# Patient Record
Sex: MALE | HISPANIC OR LATINO | Employment: FULL TIME | URBAN - METROPOLITAN AREA
[De-identification: names, ages, dates, MRNs, and addresses within clinical notes are randomized per-mention and may not be internally consistent; named-entity substitution may affect disease eponyms.]

---

## 2020-05-04 ENCOUNTER — APPOINTMENT (OUTPATIENT)
Dept: RADIOLOGY | Facility: MEDICAL CENTER | Age: 55
End: 2020-05-04
Attending: EMERGENCY MEDICINE
Payer: OTHER MISCELLANEOUS

## 2020-05-04 ENCOUNTER — HOSPITAL ENCOUNTER (EMERGENCY)
Facility: MEDICAL CENTER | Age: 55
End: 2020-05-04
Attending: EMERGENCY MEDICINE
Payer: OTHER MISCELLANEOUS

## 2020-05-04 VITALS
WEIGHT: 212.3 LBS | DIASTOLIC BLOOD PRESSURE: 87 MMHG | OXYGEN SATURATION: 94 % | RESPIRATION RATE: 15 BRPM | HEIGHT: 64 IN | HEART RATE: 68 BPM | BODY MASS INDEX: 36.25 KG/M2 | SYSTOLIC BLOOD PRESSURE: 130 MMHG | TEMPERATURE: 96.9 F
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
## 2020-05-04 DIAGNOSIS — S49.92XA INJURY OF LEFT SHOULDER, INITIAL ENCOUNTER: ICD-10-CM

## 2020-05-04 PROCEDURE — 99284 EMERGENCY DEPT VISIT MOD MDM: CPT

## 2020-05-04 PROCEDURE — A9270 NON-COVERED ITEM OR SERVICE: HCPCS | Performed by: EMERGENCY MEDICINE

## 2020-05-04 PROCEDURE — 73030 X-RAY EXAM OF SHOULDER: CPT | Mod: LT

## 2020-05-04 PROCEDURE — 700102 HCHG RX REV CODE 250 W/ 637 OVERRIDE(OP): Performed by: EMERGENCY MEDICINE

## 2020-05-04 RX ORDER — IBUPROFEN 800 MG/1
800 TABLET ORAL ONCE
Status: COMPLETED | OUTPATIENT
Start: 2020-05-04 | End: 2020-05-04

## 2020-05-04 RX ORDER — ACETAMINOPHEN 325 MG/1
650 TABLET ORAL ONCE
Status: COMPLETED | OUTPATIENT
Start: 2020-05-04 | End: 2020-05-04

## 2020-05-04 RX ADMIN — IBUPROFEN 800 MG: 800 TABLET, FILM COATED ORAL at 10:45

## 2020-05-04 RX ADMIN — ACETAMINOPHEN 650 MG: 325 TABLET, FILM COATED ORAL at 10:56

## 2020-05-04 SDOH — HEALTH STABILITY: MENTAL HEALTH: HOW OFTEN DO YOU HAVE A DRINK CONTAINING ALCOHOL?: NEVER

## 2020-05-04 NOTE — LETTER
"  FORM C-4:  EMPLOYEE’S CLAIM FOR COMPENSATION/ REPORT OF INITIAL TREATMENT  EMPLOYEE’S CLAIM - PROVIDE ALL INFORMATION REQUESTED   First Name Tera Last Name Danielito Martin Birthdate 1965  Sex Male Claim Number   Home Employee Address [address]                                     Zip  [address] Height  1.626 m (5' 4\") Weight  96.3 kg (212 lb 4.9 oz) [facility]     Mailing Employee Address [address]               Zip  [address] Telephone  [phone] (home)  Primary Language Spoken Romansh   Insurer   Third Party    Employee's Occupation (Job Title) When Injury or Occupational Disease Occurred     Employer's Name : [employer] Telephone [phone]    Employer Address [address] [facility] [address]] Zip [address]   Date of Injury      5/4/2020   Hour of Injury  0800 Date Employer Notified  05/04/2020 Last Day of Work after Injury or Occupational Disease 05/04/2020   Supervisor to Whom Injury Reported: SOLEDAD SANCHEZ     Address or Location of Accident (if applicable)  [place]   What were you doing at the time of accident? 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 Be specific and answer in detail. Use additional sheet if necessary) I WAS IN THE TRENCH, PREPARING TO PUT THE PIPES TOGETHER AND THE DIRT BEHIND ME STARTED FALLING BEHIND ME WHERE I WAS WORKING CAUSING THE [place]YWOOD BEHIND ME TO FALL, KNOCKING ME TO THE GROUND, INJURING MY LEFT SHOULDER     If you believe that you have an occupational disease, when did you first have knowledge of the disability and it relationship to your employment? N/A Witnesses to the Accident  SOLEDAD SANCHEZ   Nature of Injury or Occupational Disease   WORKERS COMPENSATION   Part(s) of Body Injured or Affected  , , LEFT SHOULDER   I CERTIFY THAT THE ABOVE IS TRUE AND CORRECT TO THE BEST OF MY KNOWLEDGE AND " THAT I HAVE PROVIDED THIS INFORMATION IN ORDER TO OBTAIN THE BENEFITS OF [place]).  I HEREBY AUTHORIZE ANY PHYSICIAN, CHIROPRACTOR, SURGEON, PRACTITIONER, OR OTHER PERSON, ANY HOSPITAL, INCLUDING [facility], ANY MEDICAL SERVICE ORGANIZATION, ANY INSURANCE COMPANY, OR OTHER INSTITUTION OR ORGANIZATION TO RELEASE TO EACH OTHER, ANY MEDICAL OR OTHER INFORMATION, INCLUDING BENEFITS PAID OR PAYABLE, PERTINENT TO THIS INJURY OR DISEASE, EXCEPT INFORMATION RELATIVE TO DIAGNOSIS, TREATMENT AND/OR COUNSELING FOR AIDS, PSYCHOLOGICAL CONDITIONS, ALCOHOL OR CONTROLLED SUBSTANCES, FOR WHICH I MUST GIVE SPECIFIC AUTHORIZATION.  A PHOTOSTAT OF THIS AUTHORIZATION SHALL BE AS VALID AS THE ORIGINAL.  Date    05/04/2020                            Place          [facility]                                      Employee’s Signature   THIS REPORT MUST BE COMPLETED AND MAILED WITHIN 3 WORKING DAYS OF TREATMENT   Place [facility], EMERGENCY DEPT                                                                             Name of Facility [facility]   Date  5/4/2020 Diagnosis  (S49.92XA) Injury of left shoulder, initial encounter  (W19.XXXA) Fall, initial encounter Is there evidence the injured employee was under the influence of alcohol and/or another controlled substance at the time of accident?   Hour  11:47 AM Description of Injury or Disease  Injury of left shoulder, initial encounter  Fall, initial encounter No   Treatment  Sling, otc ibuprofen/tylenol/ice  Have you advised the patient to remain off work five days or more?         No   X-Ray Findings  Negative If Yes   From Date  5/4/2020 To Date  5/5/2020   From information given by the employee, together with medical evidence, can you directly connect this injury or occupational disease as job incurred? Yes If No,  "is employee capable of: Full Duty  No Modified Duty  Yes   Is additional medical care by a physician indicated? Yes If Modified Duty, Specify any Limitations / Restrictions   No use left arm at work   Do you know of any previous injury or disease contributing to this condition or occupational disease? No    Date 5/4/2020 Print Doctor’s Name Cristofer Desai certify the employer’s copy of this form was mailed on:   Address [address] [place] [id number] [phone] INSURER’S USE ONLY   Provider’s Tax ID Number [id number] Telephone Dept: [phone]    Doctor’s Signature brandon-CRISTOFER King M.D. Degree  M.D.      Form C-4 (rev.10/07)                                                                         BRIEF DESCRIPTION OF RIGHTS AND BENEFITS  (Pursuant to [place] 616[place])    Notice of Injury or Occupational Disease (Incident Report Form C-1): If an injury or occupational disease (OD) arises out of and in the course of employment, you must provide written notice to your employer as soon as practicable, but no later than 7 days after the accident or OD. Your employer shall maintain a sufficient supply of the required forms.    Claim for Compensation (Form C-4): If medical treatment is sought, the form C-4 is available at the place of initial treatment. A completed \"Claim for Compensation\" (Form C-4) must be filed within 90 days after an accident or OD. The treating physician or chiropractor must, within 3 working days after treatment, complete and mail to the employer, the employer's insurer and third-party , the Claim for Compensation.    Medical Treatment: If you require medical treatment for your on-the-job injury or OD, you may be required to select a physician or chiropractor from a list provided by your workers’ compensation insurer, if it has contracted with an Organization for Managed Care (MCO) or Preferred Provider Organization (PPO) or providers of health care. If your employer has not " entered into a contract with an MCO or PPO, you may select a physician or chiropractor from the Panel of Physicians and Chiropractors. Any medical costs related to your industrial injury or OD will be paid by your insurer.    Temporary Total Disability (TTD): If your doctor has certified that you are unable to work for a period of at least 5 consecutive days, or 5 cumulative days in a 20-day period, or places restrictions on you that your employer does not accommodate, you may be entitled to TTD compensation.    Temporary Partial Disability (TPD): If the wage you receive upon reemployment is less than the compensation for TTD to which you are entitled, the insurer may be required to pay you TPD compensation to make up the difference. TPD can only be paid for a maximum of 24 months.    Permanent Partial Disability (PPD): When your medical condition is stable and there is an indication of a PPD as a result of your injury or OD, within 30 days, your insurer must arrange for an evaluation by a rating physician or chiropractor to determine the degree of your PPD. The amount of your PPD award depends on the date of injury, the results of the PPD evaluation and your age and wage.    Permanent Total Disability (PTD): If you are medically certified by a treating physician or chiropractor as permanently and totally disabled and have been granted a PTD status by your insurer, you are entitled to receive monthly benefits not to exceed 66 2/3% of your average monthly wage. The amount of your PTD payments is subject to reduction if you previously received a PPD award.    Vocational Rehabilitation Services: You may be eligible for vocational rehabilitation services if you are unable to return to the job due to a permanent physical impairment or permanent restrictions as a result of your injury or occupational disease.    Transportation and Per Vani Reimbursement: You may be eligible for travel expenses and per vani associated with  medical treatment.    Reopening: You may be able to reopen your claim if your condition worsens after claim closure.     Appeal Process: If you disagree with a written determination issued by the insurer or the insurer does not respond to your request, you may appeal to the Department of Administration, , by following the instructions contained in your determination letter. You must appeal the determination within 70 days from the date of the determination letter at [address], [address], [place], [place] [address], or [address], [address], [place], [place] [address]. If you disagree with the  decision, you may appeal to the Department of Administration, . You must file your appeal within 30 days from the date of the  decision letter at [address], [address], [place], [place] [address], or [address], [place], [place] [address]. If you disagree with a decision of an , you may file a petition for judicial review with the District Court. You must do so within 30 days of the Appeal Officer’s decision. You may be represented by an  at your own expense or you may contact the [facility] for possible representation.    [place]  for Injured Workers (NAIW): If you disagree with a  decision, you may request that NAIW represent you without charge at an  Hearing. For information regarding denial of benefits, you may contact the [facility] at: [address], [address], [place] [address], [phone], or [address], [address], [place] [address], [phone]    To File a Complaint with the Division: If you wish to file a complaint with the  of the Division of Industrial Relations (DIR),  please contact the Workers’ Compensation Section, [address], [address], [place], [place] [address], telephone [phone], or [address], [address], [place], [place] [address], telephone [phone].    For assistance with Workers’ Compensation Issues: You may contact the Office of the Governor Consumer Health Assistance, [address] [place] [address], [address], [place], [place] [address], Toll Free 1-924.161.9801, Web site: [email or web address]., E-mail [email or web address].  D-2 (rev. 06/18)              __________________________________________________________________                                    _________________            Employee Name / Signature                                                                                                                            Date

## 2020-05-04 NOTE — DISCHARGE INSTRUCTIONS
Babita ibuprofen 600mg 4 veces diario a menos que le have makenzie al estomago.  Anade tylenol y ponga hielo en hombro 15 minutos 6 veces diario dos glass.  Vaya a Occupational Health y vaya a Dr. Chu.    Posiblamiente usted tiene dimitri presion de magui.  Vaya a un medico propio para chequear la presion y el nivel de cholesterol.  142/86

## 2020-05-04 NOTE — ED PROVIDER NOTES
"ED Provider Note    CHIEF COMPLAINT   Chief Complaint   Patient presents with   • Shoulder Injury     left shoulder, 2 pipes fell onto left shoulder   • T-5000   • Tingling     left fingers     History obtained directly in [place]    HPI   Tera Villa is a 54 y.o. male who presents with left nondominant shoulder pain.  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  He is unsure but he also may have been injured by striking his elbow on the ground.    He has some tingling in the fingertips but no weakness.  He has some mild low back pain.  Shoulder pain is quite severe and worse with movement.  Denies head injury, neck pain, chest pain or abdominal pain    REVIEW OF SYSTEMS   Pertinent positives: Work-related left shoulder injury.  Pertinent negatives: Weakness, fever, cough, shortness of breath.    PAST MEDICAL HISTORY   Denies    PAST SURGICAL HISTORY  No prior shoulder surgery    CURRENT MEDICATIONS   Home Medications     Reviewed by Latoya Ramires R.N. (Registered Nurse) on 05/04/20 at 0958  Med List Status: <None>   Medication Last Dose Status        Patient Lalo Taking any Medications                       ALLERGIES   No Known Allergies    PHYSICAL EXAM  VITAL SIGNS: /86   Pulse 72   Temp 36.1 °C (96.9 °F) (Temporal)   Resp 16   Ht 1.626 m (5' 4\")   Wt 96.3 kg (212 lb 4.9 oz)   SpO2 96%   BMI 36.44 kg/m²   Constitutional: Well developed, Well nourished, mildly elevated blood pressure, appears to be in pain, holding his left arm against his body.  HENT: Atraumatic.   Cardiovascular:  Peripheral pulses radial 2+.  Skin: Intact without wounds or bruising.   Musculoskeletal: Tenderness over the proximal humerus.  No vacancy under the acromion.  No clavicle or AC tenderness.  No elbow tenderness.  No tenderness below the elbow.  Cervical thoracic and lumbar spine nontender  Compartments: Soft arm and forearm  Neurologic: Finger flexion extension " abduction and thumb opposition intact.  Sensation intact on the pads of the first and fifth finger over the first dorsal webspace of the hand and over the deltoid.    RADIOLOGY/PROCEDURES  DX-SHOULDER 2+ LEFT   Final Result      1.  Normal shoulder series.          COURSE & MEDICAL DECISION MAKING  Well-appearing patient presents with a work-related left shoulder injury and likely has a rotator cuff injury.  There is no evidence of clavicle or humeral fracture.  There is no glenohumeral dislocation and there is no evidence of any spine elbow forearm wrist or hand injury.    PLAN:  Sling  Over-the-counter ibuprofen and Tylenol  [place]  C4 form completed  Follow-up occupational health and Dr. Chu tomorrow    CONDITION: good    FINAL IMPRESSION  1. Injury of left shoulder, initial encounter    2. Fall, initial encounter            Electronically signed by: Luis Antonio Desai M.D., 5/4/2020 10:22 AM

## 2020-05-04 NOTE — ED TRIAGE NOTES
.  Chief Complaint   Patient presents with   • Shoulder Injury     left shoulder, 2 pipes fell onto left shoulder   • T-5000   • Tingling     left fingers   ambulated to triage with above c/c. Telugu interpretor [id number]

## 2020-05-04 NOTE — LETTER
"  FORM C-4:  EMPLOYEE’S CLAIM FOR COMPENSATION/ REPORT OF INITIAL TREATMENT  EMPLOYEE’S CLAIM - PROVIDE ALL INFORMATION REQUESTED   First Name Tera Last Name Danielito Martin Birthdate 1965  Sex Male Claim Number   Home Employee Address [address]                                     Zip  [address] Height  1.626 m (5' 4\") Weight  96.3 kg (212 lb 4.9 oz) [facility]     Mailing Employee Address [address]               Zip  [address] Telephone  [phone]   Primary Language Spoken Maori   Insurer   Third Party    Employee's Occupation (Job Title) When Injury or Occupational Disease Occurred     Employer's Name [employer] Telephone [phone]    Employer Address [address] [facility] [[address]] Zip [address]   Date of Injury  5/4/2020       Hour of Injury  8:00 AM Date Employer Notified  5/4/2020 Last Day of Work after Injury or Occupational Disease  5/4/2020 Supervisor to Whom Injury Reported  SOLEDADYARA PAEZLONG   Address or Location of Accident (if applicable) [[place]]   What were you doing at the time of accident? 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 Be specific and answer in detail. Use additional sheet if necessary)  I WAS IN THE TRENCH PREPARING TO PUT THE PIPES TOGETHER AND THE DIRT BEHIND ME STARTING FALLING BEHIND ME WHERE I WAS WORKING CAUSINGTHE PLYWOOD BEHIND ME TO FALL, KNOCKING ME TO THE GROUND, INJURING MY LEFT SHOULDER.   If you believe that you have an occupational disease, when did you first have knowledge of the disability and it relationship to your employment? N/A Witnesses to the Accident  SOLEDAD SANCHEZ   Nature of Injury or Occupational Disease  Workers' Compensation Part(s) of Body Injured or Affected  Shoulder (L), N/A, N/A    I CERTIFY THAT THE ABOVE IS TRUE AND CORRECT TO THE BEST OF MY KNOWLEDGE AND THAT I HAVE " PROVIDED THIS INFORMATION IN ORDER TO OBTAIN THE BENEFITS OF [place] IN[place]IVE OR CHAPTER 617 OF [place]).  I HEREBY AUTHORIZE ANY PHYSICIAN, CHIROPRACTOR, SURGEON, PRACTITIONER, OR OTHER PERSON, ANY HOSPITAL, INCLUDING [facility] OR [facility], ANY MEDICAL SERVICE ORGANIZATION, ANY INSURANCE COMPANY, OR OTHER INSTITUTION OR ORGANIZATION TO RELEASE TO EACH OTHER, ANY MEDICAL OR OTHER INFORMATION, INCLUDING BENEFITS PAID OR PAYABLE, PERTINENT TO THIS INJURY OR DISEASE, EXCEPT INFORMATION RELATIVE TO DIAGNOSIS, TREATMENT AND/OR COUNSELING FOR AIDS, PSYCHOLOGICAL CONDITIONS, ALCOHOL OR CONTROLLED SUBSTANCES, FOR WHICH I MUST GIVE SPECIFIC AUTHORIZATION.  A PHOTOSTAT OF THIS AUTHORIZATION SHALL BE AS VALID AS THE ORIGINAL.  Date           5/4/2020                Place           [facility]                                              Employee’s Signature   THIS REPORT MUST BE COMPLETED AND MAILED WITHIN 3 WORKING DAYS OF TREATMENT   Place [facility], EMERGENCY DEPT                                                                             Name of Facility [facility]   Date  5/4/2020 Diagnosis  (S49.92XA) Injury of left shoulder, initial encounter  (W19.XXXA) Fall, initial encounter Is there evidence the injured employee was under the influence of alcohol and/or another controlled substance at the time of accident?   Hour  11:58 AM Description of Injury or Disease  Injury of left shoulder, initial encounter  Fall, initial encounter No   Treatment  Sling, otc ibuprofen/tylenol/ice  Have you advised the patient to remain off work five days or more?         No   X-Ray Findings  Negative If Yes   From Date  5/4/2020 To Date  5/5/2020   From information given by the employee, together with medical evidence, can you directly connect this injury or occupational disease as job incurred? Yes If No, is  "employee capable of: Full Duty  No Modified Duty  Yes   Is additional medical care by a physician indicated? Yes If Modified Duty, Specify any Limitations / Restrictions   No use left arm at work   Do you know of any previous injury or disease contributing to this condition or occupational disease? No    Date 5/4/2020 Print Doctor’s Name Cristofer Desai certify the employer’s copy of this form was mailed on:   Address [address] [place] [id number] [phone] INSURER’S USE ONLY   Provider’s Tax ID Number [id number] Telephone Dept: [phone]    Doctor’s Signature brandon-CRISTOFER King M.D. Degree  M.D.      Form C-4 (rev.10/07)                                                                         BRIEF DESCRIPTION OF RIGHTS AND BENEFITS  (Pursuant to [place]RS 616C.050)    Notice of Injury or Occupational Disease (Incident Report Form C-1): If an injury or occupational disease (OD) arises out of and in the course of employment, you must provide written notice to your employer as soon as practicable, but no later than 7 days after the accident or OD. Your employer shall maintain a sufficient supply of the required forms.    Claim for Compensation (Form C-4): If medical treatment is sought, the form C-4 is available at the place of initial treatment. A completed \"Claim for Compensation\" (Form C-4) must be filed within 90 days after an accident or OD. The treating physician or chiropractor must, within 3 working days after treatment, complete and mail to the employer, the employer's insurer and third-party , the Claim for Compensation.    Medical Treatment: If you require medical treatment for your on-the-job injury or OD, you may be required to select a physician or chiropractor from a list provided by your workers’ compensation insurer, if it has contracted with an Organization for Managed Care (MCO) or Preferred Provider Organization (PPO) or providers of health care. If your employer has not " entered into a contract with an MCO or PPO, you may select a physician or chiropractor from the Panel of Physicians and Chiropractors. Any medical costs related to your industrial injury or OD will be paid by your insurer.    Temporary Total Disability (TTD): If your doctor has certified that you are unable to work for a period of at least 5 consecutive days, or 5 cumulative days in a 20-day period, or places restrictions on you that your employer does not accommodate, you may be entitled to TTD compensation.    Temporary Partial Disability (TPD): If the wage you receive upon reemployment is less than the compensation for TTD to which you are entitled, the insurer may be required to pay you TPD compensation to make up the difference. TPD can only be paid for a maximum of 24 months.    Permanent Partial Disability (PPD): When your medical condition is stable and there is an indication of a PPD as a result of your injury or OD, within 30 days, your insurer must arrange for an evaluation by a rating physician or chiropractor to determine the degree of your PPD. The amount of your PPD award depends on the date of injury, the results of the PPD evaluation and your age and wage.    Permanent Total Disability (PTD): If you are medically certified by a treating physician or chiropractor as permanently and totally disabled and have been granted a PTD status by your insurer, you are entitled to receive monthly benefits not to exceed 66 2/3% of your average monthly wage. The amount of your PTD payments is subject to reduction if you previously received a PPD award.    Vocational Rehabilitation Services: You may be eligible for vocational rehabilitation services if you are unable to return to the job due to a permanent physical impairment or permanent restrictions as a result of your injury or occupational disease.    Transportation and Per Vani Reimbursement: You may be eligible for travel expenses and per vani associated with  medical treatment.    Reopening: You may be able to reopen your claim if your condition worsens after claim closure.     Appeal Process: If you disagree with a written determination issued by the insurer or the insurer does not respond to your request, you may appeal to the Department of Administration, , by following the instructions contained in your determination letter. You must appeal the determination within 70 days from the date of the determination letter at [address], [address], [place], [place] [address], or [address], [place], [place] [address]. If you disagree with the  decision, you may appeal to the Department of Administration, . You must file your appeal within 30 days from the date of the  decision letter at [address], [address], [place], [place] [address], or [address], [place], [place] [address]. If you disagree with a decision of an , you may file a petition for judicial review with the District Court. You must do so within 30 days of the Appeal Officer’s decision. You may be represented by an  at your own expense or you may contact the [facility] for possible representation.    [place]  for Injured Workers (NAIW): If you disagree with a  decision, you may request that NAIW represent you without charge at an  Hearing. For information regarding denial of benefits, you may contact the [facility] at: [address], [address], [place] [address], [phone], or [address], [address], [place] [address], [phone]    To File a Complaint with the Division: If you wish to file a complaint with the  of the Division of Industrial Relations (DIR),  please contact the Workers’ Compensation Section, [address], [address], [place], [place] [address], telephone [phone], or [address], [address], [place], [place] [address], telephone [phone].    For assistance with Workers’ Compensation Issues: You may contact the Office of the Governor Consumer Health Assistance, [address] [place] [address], [address], [place], [place] [address], Toll Free 1-520.697.3959, Web site: [email or web address]., E-mail [email or web address].  D-2 (rev. 06/18)              __________________________________________________________________                                    _________________            Employee Name / Signature                                                                                                                            Date

## 2020-05-04 NOTE — ED NOTES
services used: [facility] #[id number].  Pt discharged to home.  Pt given discharge paperwork and all applicable prescriptions written by provider.  Pt to follow with PCP, when indicated.  Pt verbalizes understanding of discharge teaching and will return to ED if condition worsens.

## 2020-05-05 ENCOUNTER — OCCUPATIONAL MEDICINE (OUTPATIENT)
Dept: URGENT CARE | Facility: CLINIC | Age: 55
End: 2020-05-05
Payer: OTHER MISCELLANEOUS

## 2020-05-05 VITALS
HEIGHT: 64 IN | BODY MASS INDEX: 36.19 KG/M2 | WEIGHT: 212 LBS | TEMPERATURE: 99.4 F | OXYGEN SATURATION: 98 % | RESPIRATION RATE: 14 BRPM | HEART RATE: 68 BPM

## 2020-05-05 DIAGNOSIS — S43.402D SPRAIN OF LEFT SHOULDER, SUBSEQUENT ENCOUNTER: ICD-10-CM

## 2020-05-05 PROCEDURE — 99213 OFFICE O/P EST LOW 20 MIN: CPT | Performed by: NURSE PRACTITIONER

## 2020-05-05 ASSESSMENT — ENCOUNTER SYMPTOMS
CONSTITUTIONAL NEGATIVE: 1
MYALGIAS: 1
WEAKNESS: 1
TINGLING: 1
RESPIRATORY NEGATIVE: 1
SENSORY CHANGE: 1
CARDIOVASCULAR NEGATIVE: 1
PSYCHIATRIC NEGATIVE: 1

## 2020-05-05 ASSESSMENT — PAIN SCALES - GENERAL: PAINLEVEL: 5=MODERATE PAIN

## 2020-05-05 NOTE — PROGRESS NOTES
Subjective:      Tera Martin is a 54 y.o. male who presents with Follow-Up ( DOI 05/04/2020 Right Shoulder - Same - RM 03)      DOI 5/4/20: Tera Villa is a 54 y.o. male who presents with left nondominant shoulder pain.  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  He is unsure but he also may have been injured by striking his elbow on the ground.    He has some tingling in the fingertips but no weakness.  He has some mild low back pain.  Shoulder pain is quite severe and worse with movement.  Denies head injury, neck pain, chest pain or abdominal pain.     Patient speaks Icelandic,  used at this visit.  Patient states no improvement, severe pain.  Pain is described as constant at rest, throbbing, aching, and tingling in his fingers.  Patient denies numbness.  Patient states he is only able to move his shoulder when he is bent over, otherwise it is very weak and he cannot lift it above waist height.  Patient is taking Tylenol with moderate relief.  Patient is icing 2-3 times a day with moderate relief.  Patient is wearing an arm sling 100% of the time.  Patient has not been back to work since incident.  Patient denies previous trauma, arthritis, or health issues i.e. diabetes.  Plan of care discussed with patient.     HPI    Review of Systems   Constitutional: Negative.    Respiratory: Negative.    Cardiovascular: Negative.    Musculoskeletal: Positive for joint pain and myalgias.   Skin: Negative.    Neurological: Positive for tingling, sensory change and weakness.   Psychiatric/Behavioral: Negative.         ROS: All systems were reviewed on intake form, form was reviewed and signed. See scanned documents in media. Pertinent positives and negatives included in HPI.    PMH: No pertinent past medical history to this problem  MEDS: Medications were reviewed in Epic  ALLERGIES: No Known Allergies  SOCHX: Works as  at [employer]  FH: No pertinent family history to this problem   Objective:     Pulse 68   Temp 37.4 °C (99.4 °F) (Temporal)   Resp 14   Ht 1.626 m (5' 4\")   Wt 96.2 kg (212 lb)   SpO2 98%   BMI 36.39 kg/m²      Physical Exam  Constitutional:       General: He is not in acute distress.     Appearance: Normal appearance. He is not ill-appearing.   Cardiovascular:      Rate and Rhythm: Normal rate and regular rhythm.   Musculoskeletal:         General: Tenderness and signs of injury present. No swelling or deformity.   Skin:     General: Skin is warm and dry.      Capillary Refill: Capillary refill takes less than 2 seconds.      Findings: No bruising or erythema.   Neurological:      General: No focal deficit present.      Mental Status: He is alert and oriented to person, place, and time.      Cranial Nerves: No cranial nerve deficit.      Sensory: No sensory deficit.      Motor: Weakness present.      Coordination: Coordination normal.      Gait: Gait normal.      Deep Tendon Reflexes: Reflexes normal.   Psychiatric:         Mood and Affect: Mood normal.         Behavior: Behavior normal.         Left shoulder: No obvious deformities noted.  No edema, erythema, bruising, or warmth present.  Very limited range of motion, secondary to pain.  Patient observed able to move arm and shoulder while bent over, however when he sits up severe guarding and poor effort in exam.  Apley scratch test negative.  Inability to participate in apprehension test.  Bicep tendon appears to be intact, no obvious abnormalities observed.  Distal strength and neurovascular sensation intact, with limited exam at this visit.  Positive TTP to AC joint, with very light touch    Left shoulder x-ray 5/4/2020: IMPRESSION:     1.  Normal shoulder series.       Assessment/Plan:       1. Sprain of left shoulder, subsequent encounter    Follow-up in 2 weeks   Restricted duty   Continue with OTC Tylenol as needed   Continue with icing 2-3 times per day " or as needed   Continue with arm sling while at work or walking on uneven surfaces, wean at home   Gentle range of motion and stretching exercises as demonstrated   Consider physical therapy referral, if indicated

## 2020-05-05 NOTE — LETTER
[facility]  [address], [address] [place] [address]  Phone:  [phone] - Fax:  [phone]   [facility] Progress Report and Disability Certification  Date of Service: 5/5/2020   No Show:  No  Date / Time of Next Visit: 5/19/2020 @ 9:30 am    Claim Information   Patient Name: Tera Martin  Claim Number:     Employer:   [employer] Date of Injury: 5/4/2020     Insurer / TPA: Misc Workers Comp  ID / SSN:     Occupation:   Diagnosis: The encounter diagnosis was Sprain of left shoulder, subsequent encounter.    Medical Information   Related to Industrial Injury? Yes    Subjective Complaints:  DOI 5/4/20: Tera Villa is a 54 y.o. male who presents with left nondominant shoulder pain.  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  He is unsure but he also may have been injured by striking his elbow on the ground.    He has some tingling in the fingertips but no weakness.  He has some mild low back pain.  Shoulder pain is quite severe and worse with movement.  Denies head injury, neck pain, chest pain or abdominal pain.     Patient speaks Citizen of the [place],  used at this visit.  Patient states no improvement, severe pain.  Pain is described as constant at rest, throbbing, aching, and tingling in his fingers.  Patient denies numbness.  Patient states he is only able to move his shoulder when he is bent over, otherwise it is very weak and he cannot lift it above waist height.  Patient is taking Tylenol with moderate relief.  Patient is icing 2-3 times a day with moderate relief.  Patient is wearing an arm sling 100% of the time.  Patient has not been back to work since incident.  Patient denies previous trauma, arthritis, or health issues i.e. diabetes.  Plan of care discussed with patient.   Objective Findings: Left shoulder: No obvious deformities noted.  No edema, erythema, bruising, or  warmth present.  Very limited range of motion, secondary to pain.  Patient observed able to move arm and shoulder while bent over, however when he sits up severe guarding and poor effort in exam.  Apley scratch test negative.  Inability to participate in apprehension test.  Bicep tendon appears to be intact, no obvious abnormalities observed.  Distal strength and neurovascular sensation intact, with limited exam at this visit.  Positive TTP to AC joint, with very light touch    Left shoulder x-ray 5/4/2020: IMPRESSION:     1.  Normal shoulder series.   Pre-Existing Condition(s):     Assessment:   Condition Same    Status: Additional Care Required  Permanent Disability:No    Plan:      Diagnostics:      Comments:  Follow-up in 2 weeks  Restricted duty  Continue with OTC Tylenol as needed  Continue with icing 2-3 times per day or as needed  Continue with arm sling while at work or walking on uneven surfaces, wean at home  Gentle range of motion and stretching e  xercises as demonstrated  Consider physical therapy referral, if indicated     Disability Information   Status: Released to Restricted Duty    From:  5/5/2020  Through: 5/19/2020 Restrictions are: Temporary   Physical Restrictions   Sitting:    Standing:    Stooping:    Bending:      Squatting:    Walking:    Climbing:    Pushing:  < or = to 1 hr/day  Comments:Left shoulder only   Pulling:  < or = to 1 hr/day  Comments:Left shoulder only Other:    Reaching Above Shoulder (L): < or = to 2 hrs/day Reaching Above Shoulder (R):       Reaching Below Shoulder (L):    Reaching Below Shoulder (R):      Not to exceed Weight Limits   Carrying(hrs):   Weight Limit(lb): < or = to 10 pounds  Comments:Left shoulder only Lifting(hrs):   Weight  Limit(lb): < or = to 10 pounds  Comments:Left shoulder only   Comments:      Repetitive Actions   Hands: i.e. Fine Manipulations from Grasping:     Feet: i.e. Operating Foot Controls:     Driving / Operate Machinery:     Physician  Name: MARCUS Mari Physician Signature:   e-Signature: Dr. Jamey Harley, Medical Director   Clinic Name / Location: [address], [address]  [place] [address] Clinic Phone Number: Dept: [phone]   Appointment Time: 2:30 Pm Visit Start Time: 1:42 PM   Check-In Time:  1:42 Pm Visit Discharge Time:  2:25 pm   Original-Treating Physician or Chiropractor    Page 2-Insurer/TPA    Page 3-Employer    Page 4-Employee

## 2020-05-27 ENCOUNTER — OCCUPATIONAL MEDICINE (OUTPATIENT)
Dept: URGENT CARE | Facility: CLINIC | Age: 55
End: 2020-05-27
Payer: COMMERCIAL

## 2020-05-27 DIAGNOSIS — S43.402D SPRAIN OF LEFT SHOULDER, SUBSEQUENT ENCOUNTER: ICD-10-CM

## 2020-05-27 PROCEDURE — 99212 OFFICE O/P EST SF 10 MIN: CPT | Performed by: NURSE PRACTITIONER

## 2020-05-27 ASSESSMENT — ENCOUNTER SYMPTOMS
CONSTITUTIONAL NEGATIVE: 1
MYALGIAS: 1
RESPIRATORY NEGATIVE: 1
PSYCHIATRIC NEGATIVE: 1
NEUROLOGICAL NEGATIVE: 1
CARDIOVASCULAR NEGATIVE: 1

## 2020-05-27 NOTE — LETTER
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    Medical Information   Related to Industrial Injury? Yes    Subjective Complaints:  DOI 5/4/20: Tera Villa is a 54 y.o. male who presents with left nondominant shoulder pain.  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  He is unsure but he also may have been injured by striking his elbow on the ground.    He has some tingling in the fingertips but no weakness.  He has some mild low back pain.  Shoulder pain is quite severe and worse with movement.  Denies head injury, neck pain, chest pain or abdominal pain.      Patient speaks Azeri,  used at this visit.  Patient states no improvement, moderate pain.  Pain is described as intermittent, bone-on-bone, throbbing, aching, and tingling in his fingers.  Patient denies numbness. Patient is rarely taking Tylenol because he feels it's not that bad.  Patient is icing 2-3 times a day with moderate relief.  Patient has weaned the arm sling.  Patient tolerating light duty.  Patient denies previous trauma, arthritis, or health issues i.e. diabetes.  Plan of care discussed with patient.    Objective Findings: Left shoulder: No obvious deformities noted.  No edema, erythema, bruising, or warmth present.  Limited ROM, secondary to pain.  Patient observed with improved movement, until about shoulder height.  Apley scratch test  negative.  Apprehension test, pos.  Bicep tendon appears to be intact, no obvious abnormalities observed.  Distal strength and neurovascular sensation intact, with limited exam at this visit.  Positive TTP to AC joint, with very light touch     Left shoulder x-ray 5/4/2020: IMPRESSION:     1.  Normal shoulder series.   Pre-Existing Condition(s):     Assessment:   Condition Same    Status: Additional Care Required  Permanent Disability:No    Plan: PTDiagnostics    Diagnostics: MRI    Comments:  Follow-up in 3 weeks   Restricted duty   Continue with OTC Tylenol as needed   Continue with icing 2-3 times per day or as needed   Continue with arm sling while at work or walking on uneven surfaces, wean at home   Gentle range of motion and stretch  ing exercises as demonstrated   Physical therapy referral placed  MRI ordered      Disability Information   Status: Released to Restricted Duty    From:  5/27/2020  Through: 6/17/2020 Restrictions are: Temporary   Physical Restrictions   Sitting:    Standing:    Stooping:    Bending:      Squatting:    Walking:    Climbing:    Pushing:  < or = to 1 hr/day  Comments:Left shoulder only    Pulling:  < or = to 1 hr/day  Comments:Left shoulder only  Other:    Reaching Above Shoulder (L): < or = to 2 hrs/day  Comments:Left shoulder only  Reaching Above Shoulder (R):       Reaching Below Shoulder (L):    Reaching Below Shoulder (R):      Not to exceed Weight Limits   Carrying(hrs):   Weight Limit(lb): < or = to 10 pounds  Comments:Left shoulder only  Lifting(hrs):   Weight  Limit(lb): < or = to 10 pounds  Comments:Left shoulder only    Comments:      Repetitive Actions   Hands: i.e. Fine Manipulations from Grasping: < or = to 1 hr/day  Comments:Left shoulder only    Feet: i.e. Operating Foot Controls:     Driving / Operate Machinery:     Physician Name: MARCUS Mari Physician Signature:   e-Signature: Dr. Jamey Harley, Medical Director   Clinic Name / Location: [facility]  MEDICAL [address], [address]  [place] [address] Clinic Phone Number: Dept: [phone]   Appointment Time: 3:30 Pm Visit Start Time:    Check-In Time:  3:20 Pm Visit Discharge Time: 3:45 PM    Original-Treating Physician or Chiropractor    Page 2-Insurer/TPA    Page 3-Employer    Page 4-Employee

## 2020-05-27 NOTE — PROGRESS NOTES
Subjective:      Tera Martin is a 54 y.o. male who presents with No chief complaint on file.      DOI 5/4/20: Tera Villa is a 54 y.o. male who presents with left nondominant shoulder pain.  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  He is unsure but he also may have been injured by striking his elbow on the ground.    He has some tingling in the fingertips but no weakness.  He has some mild low back pain.  Shoulder pain is quite severe and worse with movement.  Denies head injury, neck pain, chest pain or abdominal pain.      Patient speaks [place]araguan,  used at this visit.  Patient states no improvement, moderate pain.  Pain is described as intermittent, bone-on-bone, throbbing, aching, and tingling in his fingers.  Patient denies numbness. Patient is rarely taking Tylenol because he feels it's not that bad.  Patient is icing 2-3 times a day with moderate relief.  Patient has weaned the arm sling.  Patient tolerating light duty.  Patient denies previous trauma, arthritis, or health issues i.e. diabetes.  Plan of care discussed with patient.      HPI    Review of Systems   Constitutional: Negative.    Respiratory: Negative.    Cardiovascular: Negative.    Musculoskeletal: Positive for joint pain and myalgias.   Skin: Negative.    Neurological: Negative.    Psychiatric/Behavioral: Negative.         SOCHX: Works as  at [employer]  FH: No pertinent family history to this problem   Objective:     There were no vitals taken for this visit.     Physical Exam  Constitutional:       General: He is not in acute distress.     Appearance: Normal appearance. He is not ill-appearing.   Cardiovascular:      Pulses: Normal pulses.   Pulmonary:      Effort: Pulmonary effort is normal.   Musculoskeletal:         General: Tenderness and signs of injury present. No swelling or deformity.   Skin:     General: Skin is warm and dry.       Capillary Refill: Capillary refill takes less than 2 seconds.      Findings: No bruising or erythema.   Neurological:      General: No focal deficit present.      Mental Status: He is alert and oriented to person, place, and time.      Cranial Nerves: No cranial nerve deficit.      Sensory: No sensory deficit.      Motor: No weakness.      Coordination: Coordination normal.      Gait: Gait normal.      Deep Tendon Reflexes: Reflexes normal.   Psychiatric:         Mood and Affect: Mood normal.         Behavior: Behavior normal.         Left shoulder: No obvious deformities noted.  No edema, erythema, bruising, or warmth present.  Limited ROM, secondary to pain.  Patient observed with improved movement, until about shoulder height.  Apley scratch test negative.  Apprehension test, pos.  Bicep tendon appears to be intact, no obvious abnormalities observed.  Distal strength and neurovascular sensation intact, with limited exam at this visit.  Positive TTP to AC joint, with very light touch     Left shoulder x-ray 5/4/2020: IMPRESSION:     1.  Normal shoulder series.       Assessment/Plan:       1. Sprain of left shoulder, subsequent encounter    - MR-SHOULDER-W/O LEFT; Future  - REFERRAL TO RADIOLOGY  - REFERRAL TO PHYSICAL THERAPY Reason for Therapy: Eval/Treat/Report    Follow-up in 3 weeks   Restricted duty   Continue with OTC Tylenol as needed   Continue with icing 2-3 times per day or as needed   Continue with arm sling while at work or walking on uneven surfaces, wean at home   Gentle range of motion and stretching exercises as demonstrated   Physical therapy referral placed   MRI ordered

## 2020-06-05 ENCOUNTER — PHYSICAL THERAPY (OUTPATIENT)
Dept: PHYSICAL THERAPY | Facility: REHABILITATION | Age: 55
End: 2020-06-05
Attending: NURSE PRACTITIONER
Payer: COMMERCIAL

## 2020-06-05 DIAGNOSIS — S43.402D SPRAIN OF LEFT SHOULDER, SUBSEQUENT ENCOUNTER: ICD-10-CM

## 2020-06-05 PROCEDURE — 97161 PT EVAL LOW COMPLEX 20 MIN: CPT

## 2020-06-05 PROCEDURE — 97014 ELECTRIC STIMULATION THERAPY: CPT

## 2020-06-05 SDOH — ECONOMIC STABILITY: GENERAL: QUALITY OF LIFE: EXCELLENT

## 2020-06-05 ASSESSMENT — ENCOUNTER SYMPTOMS
PAIN SCALE AT HIGHEST: 7
EXACERBATED BY: CARRYING
EXACERBATED BY: SLEEPING
PAIN SCALE: 2
PAIN SCALE AT LOWEST: 5
EXACERBATED BY: OVERHEAD ACTIVITY
QUALITY: ACHING
ALLEVIATING FACTORS: POSITION CHANGE
PAIN TIMING: INTERMITTENT
EXACERBATED BY: LIFTING
QUALITY: TIGHT
ALLEVIATING FACTORS: ACTIVITY MODIFICATION

## 2020-06-05 NOTE — Clinical Note
June 5, 2020    JACQUELYN Mari  [address] [facility] [address]    Patient: Tera Martin   YOB: 1965   Date of Visit: 6/5/2020       Dear Jacquelyn Mari  [address] [place] [id number] [phone]    The attached plan of care is being sent to you because your patient’s medical reimbursement requires that you certify the plan of care. Your signature is required to allow uninterrupted insurance coverage.      You may indicate your approval by signing below and faxing this form back to us at Dept Fax: [phone].    Please call Dept: [phone] with any questions or concerns.    Thank you for this referral,        Ara Gamble, PT, DPT  [facility] PHYSICAL [facility]  [address] [facility] [address] [phone]    Payer: Payor: [employer] / Plan:  [employer] / Product Type: *No Product type* /                                                                  Specialty Plan of Care 06/05/20   Effective from: 6/5/2020  Effective to: 7/10/2020    Plan ID: [id number]           Participants     Name Type Comments Contact Info    JACQUELYN Mari Provider  [phone]    Ara Gamble PT, DPT PT        Evaluation/Progress Summary     Author: Ara Gamble PT, DPT Status: Sign when Signing Visit Last edited: 6/5/2020  9:30 AM         Outpatient Physical Therapy  INITIAL EVALUATION    [facility]  [address] [address]  Phone:  [phone]  Fax:  [phone]    Date of Evaluation: 06/05/2020    Patient: Tera Martin  YOB: 1965  MRN: [id number]     Referring Provider: JACQUELYN Mari  [address] [place] [address]   Referring Diagnosis Sprain of left shoulder, subsequent encounter [S43.402D]     Time Calculation                   Chief Complaint: Shoulder Injury and Work-Related Injury    Visit Diagnoses    "ICD-10-CM   1. Sprain of left shoulder, subsequent encounter  S43.402D         Subjective:   History of Present Illness:     Mechanism of injury:  Evaluation using language line  Ukrainian [id number]    Patient with chief complaint of L shoulder pain.     Per ED note 2020    \"Tera Villa is a 54 y.o. male who presents with left nondominant shoulder pain.  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  He is unsure but he also may have been injured by striking his elbow on the ground.    He has some tingling in the fingertips but no weakness.  He has some mild low back pain.  Shoulder pain is quite severe and worse with movement.  Denies head injury, neck pain, chest pain or abdominal pain\"    X-rays showed no pathology. Scheduled for MRI.     At today's visit:  Pain is anterior and posterior L shoulder/proximal arm. At time, pain radiates down anterior L arm to elbow. Denies numbness/tingling.  Currently, L shoulder hurts primarily with lifting it above 90 deg forwards or to the side. Symptoms are further provoked with lifting, pulling. The pain also wakes him up at night if laying on L side. There is mild pain at rest.  Relieved with tylenol. Only takes tylenol when it is flared up.     Is currently on light duty at work.             Quality of life:  Excellent  Prior level of function:  Works full time, manual labor outdoors, lifting, pushing, pulling  Headaches:  no headaches  Sleep disturbance:  Interrupted sleep  Pain:     Current pain ratin    At best pain ratin    At worst pain ratin    Quality:  Aching and tight    Pain timing:  Intermittent    Relieving factors:  Activity modification and position change    Aggravating factors:  Carrying, sleeping, lifting and overhead activity    Progression:  Improving  Hand dominance:  Right  Diagnostic Tests:     X-ray: normal    Treatments:     Current treatment:  Activity modification  Patient " Goals:     Patient goals for therapy:  Return to work, increased motion, decreased pain and increased strength    Other patient goals:  To get back to work      No past medical history on file.  No past surgical history on file.  Social History     Tobacco Use   • Smoking status: Never Smoker   • Smokeless tobacco: Never Used   Substance Use Topics   • Alcohol use: Never     Frequency: Never     Family and Occupational History     Socioeconomic History   • Marital status: Single     Spouse name: Not on file   • Number of children: Not on file   • Years of education: Not on file   • Highest education level: Not on file   Occupational History   • Not on file       Objective     Static Posture     Head  Forward.    Shoulders  Rounded.    Cervical Screen    Cervical range of motion within normal limits  Thoracic Screen    Thoracic range of motion within normal limits    Neurological Testing     Sensation     Shoulder   Left Shoulder   Intact: light touch    Right Shoulder   Intact: light touch    Reflexes   Left   Biceps (C5/C6): normal (2+)  Brachioradialis (C6): normal (2+)  Triceps (C7): normal (2+)    Right   Biceps (C5/C6): normal (2+)  Brachioradialis (C6): normal (2+)  Triceps (C7): normal (2+)    Palpation   Left   No palpable tenderness to the biceps and cervical paraspinals.   Hypertonic in the latissimus and supraspinatus.   Tenderness of the infraspinatus, latissimus, pectoralis major, subscapularis and supraspinatus.     Tenderness     Left Shoulder   Tenderness in the subscapularis tendon and supraspinatus tendon.     Right Shoulder  No tenderness     Active Range of Motion   Left Shoulder   Flexion: 120 degrees with pain  Extension: WFL  Abduction: 120 degrees with pain  External rotation 0°: 78 degrees with pain    Right Shoulder   Normal active range of motion    Passive Range of Motion   Left Shoulder   Flexion: 170 degrees   Extension: WFL  Abduction: WFL  Adduction: WFL  External rotation 0°: 80  (posterior shoudler pain with OP at end range) degrees with pain  External rotation 90°: 90 (posterior shoulder pain with OP at end range) degrees with pain    Right Shoulder   Normal passive range of motion  Flexion: 180 degrees   External rotation 0°: 90 degrees   External rotation 90°: 100 degrees     Scapular Mobility   Left Shoulder   Scapular mobility: WFL    Right Shoulder   Scapular mobility: WFL    Joint Play   Left Shoulder     Anterior capsule: within functional limits    Posterior capsule: hypomobile    Inferior capsule: within functional limits    AC joint: within functional limits    SC joint: within funcational limits    Cervical spine: within functional limits    Right Shoulder     Anterior capsule: within functional limits    Posterior capsule: hypomobile    Inferior capsule: within functional limits    AC joint: within functional limits    SC joint: within normal limits    Cervical spine: within functional limits    Strength:      Left Shoulder   Planes of Motion   Flexion: 2+ (limited by pain)   Extension: 5   Abduction: 2+ (limited by pain)   Adduction: 5   External rotation at 0°: 4+ (with pain)   Internal rotation at 0°: 4+ (with pain)     Right Shoulder   Planes of Motion   Flexion: 5   Extension: 5   Abduction: 5   Adduction: 5   External rotation at 0°: 5   Internal rotation at 0°: 5     Tests     Left Shoulder   Positive empty can, full can and painful arc.   Negative belly press and Speed's.     Additional Tests Details  Full can/empty can + for pain. No significant weakness.        Therapeutic Exercises (CPT 16978):       Therapeutic Exercise Summary: Access Code: [email or web address]   URL: [email or web address]   Date: 06/05/2020   Prepared by: Ara Gamble     Exercises  Seated Scapular Retraction - 10 reps - 10x daily - 7x weekly  Corner Pec Major Stretch - 2 reps - 30 seconds hold - 10x daily - 7x weekly  Standing Shoulder Row with Anchored Resistance - 5 reps - 3 sets - 5 seocnds hold  - 10x daily - 7x weekly      Time-based treatments/modalities:           Assessment, Response and Plan:   Impairments: abnormal or restricted ROM, difficulty performing job, impaired physical strength, lacks appropriate home exercise program, limited ADL's and pain with function    Assessment details:  54 year old male presents with chief complaint of L shoulder pain following an accident at work on 5/4/2020 in which 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 Exam findings show rotator cuff pathology accompanied by mild PROM restriction likely secondary to restricted active movement following the injury. The patient will benefit from skilled PT to address associated impairments/limitations in order to return to full work role including lifting/pushing/pulling and overhead reaching. The patient is scheduled for an MRI in the coming week.   Barriers to therapy:  None  Prognosis: good    Goals:   Short Term Goals:   PROM L=R shoulder in flexion, ER, abduction  AROM L shoulder flexion/abduction 0-150 with pain </= 2/10    Short term goal time span:  2-4 weeks      Long Term Goals:    Full AROM L shoulder flexion, ER, abduction without provocation of symptoms  Patient is able to perform full work role with pain </= 1/10  QuickDASH score shows 0% impairment  Strength L shoulder 5/5 all planes  Long term goal time span:  6-8 weeks    Plan:   Therapy options:  Physical therapy treatment to continue  Planned therapy interventions:  E Stim Unattended (CPT 86584), Neuromuscular Re-education (CPT 70051) and Therapeutic Exercise (CPT 88369)  Frequency:  2x week  Duration in visits:  8  Discussed with:  Patient      Functional Assessment Used  Quickdash General Total Score: 56.82     Referring provider co-signature:  I have reviewed this plan of care and my co-signature certifies the need for services.    Physician Signature: ________________________________ Date: ______________                      Updated  Participants     Name Type Comments Contact Info    MARCUS Mari Provider  [phone]    Signature pending    Ara Gamble, PT, DPT PT

## 2020-06-05 NOTE — OP THERAPY EVALUATION
"  Outpatient Physical Therapy  INITIAL EVALUATION    [facility] [address].  [address]  [facility] [address]  Phone:  [phone]  Fax:  [phone]    Date of Evaluation: 06/05/2020    Patient: Tera Martin  YOB: 1965  MRN: [id number]     Referring Provider: MARCUS Mari  [address] [place] [id number]   Referring Diagnosis Sprain of left shoulder, subsequent encounter [S43.402D]     Time Calculation    Start time: 0930  Stop time: 1035 Time Calculation (min): 65 minutes         Chief Complaint: Shoulder Injury and Work-Related Injury    Visit Diagnoses     ICD-10-CM   1. Sprain of left shoulder, subsequent encounter  S43.402D         Subjective:   History of Present Illness:     Mechanism of injury:  Evaluation using language line  Zambian [id number]    Patient with chief complaint of L shoulder pain.     Per ED note 5/4/2020    \"Tera Villa is a 54 y.o. male who presents with left nondominant shoulder pain.  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  He is unsure but he also may have been injured by striking his elbow on the ground.    He has some tingling in the fingertips but no weakness.  He has some mild low back pain.  Shoulder pain is quite severe and worse with movement.  Denies head injury, neck pain, chest pain or abdominal pain\"    X-rays showed no pathology. Scheduled for MRI.     At today's visit:  Pain is anterior and posterior L shoulder/proximal arm. At time, pain radiates down anterior L arm to elbow. Denies numbness/tingling.  Currently, L shoulder hurts primarily with lifting it above 90 deg forwards or to the side. Symptoms are further provoked with lifting, pulling. The pain also wakes him up at night if laying on L side. There is mild pain at rest.  Relieved with tylenol. Only takes tylenol when it is flared up.     Is currently on light duty at work.           "   Quality of life:  Excellent  Prior level of function:  Works full time, manual labor outdoors, lifting, pushing, pulling  Headaches:  no headaches  Sleep disturbance:  Interrupted sleep  Pain:     Current pain ratin    At best pain ratin    At worst pain ratin    Quality:  Aching and tight    Pain timing:  Intermittent    Relieving factors:  Activity modification and position change    Aggravating factors:  Carrying, sleeping, lifting and overhead activity    Progression:  Improving  Hand dominance:  Right  Diagnostic Tests:     X-ray: normal    Treatments:     Current treatment:  Activity modification  Patient Goals:     Patient goals for therapy:  Return to work, increased motion, decreased pain and increased strength    Other patient goals:  To get back to work      No past medical history on file.  No past surgical history on file.  Social History     Tobacco Use   • Smoking status: Never Smoker   • Smokeless tobacco: Never Used   Substance Use Topics   • Alcohol use: Never     Frequency: Never     Family and Occupational History     Socioeconomic History   • Marital status: Single     Spouse name: Not on file   • Number of children: Not on file   • Years of education: Not on file   • Highest education level: Not on file   Occupational History   • Not on file       Objective     Static Posture     Head  Forward.    Shoulders  Rounded.    Cervical Screen    Cervical range of motion within normal limits  Thoracic Screen    Thoracic range of motion within normal limits    Neurological Testing     Sensation     Shoulder   Left Shoulder   Intact: light touch    Right Shoulder   Intact: light touch    Reflexes   Left   Biceps (C5/C6): normal (2+)  Brachioradialis (C6): normal (2+)  Triceps (C7): normal (2+)    Right   Biceps (C5/C6): normal (2+)  Brachioradialis (C6): normal (2+)  Triceps (C7): normal (2+)    Palpation   Left   No palpable tenderness to the biceps and cervical paraspinals.    Hypertonic in the latissimus and supraspinatus.   Tenderness of the infraspinatus, latissimus, pectoralis major, subscapularis and supraspinatus.     Tenderness     Left Shoulder   Tenderness in the subscapularis tendon and supraspinatus tendon.     Right Shoulder  No tenderness     Active Range of Motion   Left Shoulder   Flexion: 120 degrees with pain  Extension: WFL  Abduction: 120 degrees with pain  External rotation 0°: 78 degrees with pain    Right Shoulder   Normal active range of motion    Passive Range of Motion   Left Shoulder   Flexion: 170 degrees   Extension: WFL  Abduction: WFL  Adduction: WFL  External rotation 0°: 80 (posterior shoudler pain with OP at end range) degrees with pain  External rotation 90°: 90 (posterior shoulder pain with OP at end range) degrees with pain    Right Shoulder   Normal passive range of motion  Flexion: 180 degrees   External rotation 0°: 90 degrees   External rotation 90°: 100 degrees     Scapular Mobility   Left Shoulder   Scapular mobility: WFL    Right Shoulder   Scapular mobility: WFL    Joint Play   Left Shoulder     Anterior capsule: within functional limits    Posterior capsule: hypomobile    Inferior capsule: within functional limits    AC joint: within functional limits    SC joint: within funcational limits    Cervical spine: within functional limits    Right Shoulder     Anterior capsule: within functional limits    Posterior capsule: hypomobile    Inferior capsule: within functional limits    AC joint: within functional limits    SC joint: within normal limits    Cervical spine: within functional limits    Strength:      Left Shoulder   Planes of Motion   Flexion: 2+ (limited by pain)   Extension: 5   Abduction: 2+ (limited by pain)   Adduction: 5   External rotation at 0°: 4+ (with pain)   Internal rotation at 0°: 4+ (with pain)     Right Shoulder   Planes of Motion   Flexion: 5   Extension: 5   Abduction: 5   Adduction: 5   External rotation at 0°: 5    Internal rotation at 0°: 5     Tests     Left Shoulder   Positive empty can, full can and painful arc.   Negative belly press and Speed's.     Additional Tests Details  Full can/empty can + for pain. No significant weakness.        Therapeutic Exercises (CPT 19100):       Therapeutic Exercise Summary: Access Code: [email or web address]   URL: [email or web address]   Date: 06/05/2020   Prepared by: Ara Gamble     Exercises  Seated Scapular Retraction - 10 reps - 10x daily - 7x weekly  Corner Pec Major Stretch - 2 reps - 30 seconds hold - 10x daily - 7x weekly  Standing Shoulder Row with Anchored Resistance - 5 reps - 3 sets - 5 seocnds hold - 10x daily - 7x weekly    Therapeutic Treatments and Modalities:     1. 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 Exam findings show rotator cuff pathology accompanied by mild PROM restriction likely secondary to restricted active movement following the injury. The patient will benefit from skilled PT to address associated impairments/limitations in order to return to full work role including lifting/pushing/pulling and overhead reaching. The patient is scheduled for an MRI in the coming week.   Barriers to therapy:  None  Prognosis: good    Goals:   Short Term Goals:   PROM L=R shoulder in flexion, ER, abduction  AROM L shoulder flexion/abduction 0-150 with pain </= 2/10    Short term goal time span:  2-4  weeks      Long Term Goals:    Full AROM L shoulder flexion, ER, abduction without provocation of symptoms  Patient is able to perform full work role with pain </= 1/10  QuickDASH score shows 0% impairment  Strength L shoulder 5/5 all planes  Long term goal time span:  6-8 weeks    Plan:   Therapy options:  Physical therapy treatment to continue  Planned therapy interventions:  E Stim Unattended (CPT 13952), Neuromuscular Re-education (CPT 83755) and Therapeutic Exercise (CPT 08094)  Frequency:  2x week  Duration in visits:  8  Discussed with:  Patient      Functional Assessment Used  Quickdash General Total Score: 56.82     Referring provider co-signature:  I have reviewed this plan of care and my co-signature certifies the need for services.    Physician Signature: ________________________________ Date: ______________

## 2020-06-12 ENCOUNTER — PHYSICAL THERAPY (OUTPATIENT)
Dept: PHYSICAL THERAPY | Facility: REHABILITATION | Age: 55
End: 2020-06-12
Attending: NURSE PRACTITIONER
Payer: COMMERCIAL

## 2020-06-12 DIAGNOSIS — S43.402D SPRAIN OF LEFT SHOULDER, SUBSEQUENT ENCOUNTER: ICD-10-CM

## 2020-06-12 PROCEDURE — 97110 THERAPEUTIC EXERCISES: CPT

## 2020-06-12 PROCEDURE — 97140 MANUAL THERAPY 1/> REGIONS: CPT

## 2020-06-12 PROCEDURE — 97014 ELECTRIC STIMULATION THERAPY: CPT

## 2020-06-17 ENCOUNTER — PHYSICAL THERAPY (OUTPATIENT)
Dept: PHYSICAL THERAPY | Facility: REHABILITATION | Age: 55
End: 2020-06-17
Attending: NURSE PRACTITIONER
Payer: COMMERCIAL

## 2020-06-17 ENCOUNTER — OCCUPATIONAL MEDICINE (OUTPATIENT)
Dept: OCCUPATIONAL MEDICINE | Facility: CLINIC | Age: 55
End: 2020-06-17
Payer: COMMERCIAL

## 2020-06-17 VITALS
WEIGHT: 212 LBS | SYSTOLIC BLOOD PRESSURE: 122 MMHG | HEART RATE: 73 BPM | TEMPERATURE: 98 F | OXYGEN SATURATION: 99 % | BODY MASS INDEX: 34.07 KG/M2 | DIASTOLIC BLOOD PRESSURE: 70 MMHG | HEIGHT: 66 IN | RESPIRATION RATE: 12 BRPM

## 2020-06-17 DIAGNOSIS — S43.402D SPRAIN OF LEFT SHOULDER, SUBSEQUENT ENCOUNTER: ICD-10-CM

## 2020-06-17 DIAGNOSIS — S43.402D SPRAIN OF LEFT SHOULDER, UNSPECIFIED SHOULDER SPRAIN TYPE, SUBSEQUENT ENCOUNTER: ICD-10-CM

## 2020-06-17 DIAGNOSIS — S43.432D TEAR OF LEFT GLENOID LABRUM, SUBSEQUENT ENCOUNTER: ICD-10-CM

## 2020-06-17 DIAGNOSIS — S46.219A BICEPS TENDON TEAR: ICD-10-CM

## 2020-06-17 PROCEDURE — 99213 OFFICE O/P EST LOW 20 MIN: CPT | Performed by: NURSE PRACTITIONER

## 2020-06-17 PROCEDURE — 97110 THERAPEUTIC EXERCISES: CPT

## 2020-06-17 PROCEDURE — 97014 ELECTRIC STIMULATION THERAPY: CPT

## 2020-06-17 ASSESSMENT — ENCOUNTER SYMPTOMS
WEAKNESS: 1
SENSORY CHANGE: 0
RESPIRATORY NEGATIVE: 1
PSYCHIATRIC NEGATIVE: 1
TINGLING: 1
MYALGIAS: 1
CARDIOVASCULAR NEGATIVE: 1
CONSTITUTIONAL NEGATIVE: 1

## 2020-06-17 ASSESSMENT — PAIN SCALES - GENERAL: PAINLEVEL: 4=SLIGHT-MODERATE PAIN

## 2020-06-17 NOTE — PROGRESS NOTES
"Subjective:      Tera Martin is a 54 y.o. male who presents with Other (WC (left shoulder #17))      DOI 5/4/20: Tera Villa is a 54 y.o. male who presents with left nondominant shoulder pain.  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  He is unsure but he also may have been injured by striking his elbow on the ground.        Patient speaks [place],  used at this visit.  Patient states mild improvement, moderate pain.  Pain is described as intermittent, bone-on-bone, throbbing, aching, and tingling in his fingers.  Patient denies numbness. Patient is rarely taking Tylenol because he feels it's not that bad.  Patient is icing 2-3 times a day with moderate relief.  Patient is no longer using the arm sling.  Patient has been attending Physical Therapy with mild improvements. MRI results reviewed with patient. Patient tolerating light duty.  Plan of care discussed with patient.        HPI    Review of Systems   Constitutional: Negative.    Respiratory: Negative.    Cardiovascular: Negative.    Musculoskeletal: Positive for joint pain and myalgias.   Skin: Negative.    Neurological: Positive for tingling and weakness. Negative for sensory change.   Psychiatric/Behavioral: Negative.         SOCHX: Works as  at [employer]  FH: No pertinent family history to this problem   Objective:     /70 (BP Location: Right arm, Patient Position: Sitting, BP Cuff Size: Adult long)   Pulse 73   Temp 36.7 °C (98 °F) (Temporal)   Resp 12   Ht 1.676 m (5' 6\")   Wt 96.2 kg (212 lb)   SpO2 99%   BMI 34.22 kg/m²      Physical Exam  Constitutional:       General: He is not in acute distress.     Appearance: Normal appearance. He is not ill-appearing.   Cardiovascular:      Rate and Rhythm: Normal rate and regular rhythm.   Pulmonary:      Effort: Pulmonary effort is normal.   Musculoskeletal:         General: Tenderness and signs of " injury present. No swelling.   Skin:     General: Skin is warm and dry.      Capillary Refill: Capillary refill takes less than 2 seconds.      Findings: No bruising or erythema.   Neurological:      General: No focal deficit present.      Mental Status: He is alert and oriented to person, place, and time.      Cranial Nerves: No cranial nerve deficit.      Sensory: No sensory deficit.      Motor: Weakness present.      Coordination: Coordination normal.      Gait: Gait normal.      Deep Tendon Reflexes: Reflexes normal.   Psychiatric:         Mood and Affect: Mood normal.         Behavior: Behavior normal.         Left shoulder: No obvious deformities noted.  No edema, erythema, bruising, or warmth present.  Limited ROM, secondary to pain.  Patient observed with improved movement, until about shoulder height.  Apley scratch test negative.  Apprehension test, pos.  Bicep tendon appears to be intact, no obvious abnormalities observed.  Distal strength and neurovascular sensation intact, with limited exam at this visit.  Positive TTP to AC joint     Left shoulder x-ray 5/4/2020: IMPRESSION:     1.  Normal shoulder series.  MRI Left shoulder 6/10/20:   1.  Superior labral tear extending anterior to posterior and involving the biceps labral anchor.  Intra-articular portion of the long head of the biceps tendon is completely torn.  Attenuated extra-articular biceps tendon fibers within the bicipital groove with only mild retraction.  2.  Full-thickness supraspinatus and infraspinatus tendinous tears with approximately 2.9 cm of retraction.  No atrophy of the muscles bellies.  3.  Posterior descending of the humeral head and moderate sized joint effusion.       Assessment/Plan:       1. Sprain of left shoulder, unspecified shoulder sprain type, subsequent encounter    - REFERRAL TO ORTHOPEDICS    2. Tear of left glenoid labrum, subsequent encounter    - REFERRAL TO ORTHOPEDICS    3. Biceps tendon tear    - REFERRAL TO  ORTHOPEDICS    Follow-up in 3 weeks, if not seen by Orthopedics   Restricted duty, per Orthopedics   Orthopedics referral placed, transfer care   Continue with OTC Tylenol as needed   Continue with icing 2-3 times per day or as needed     Gentle range of motion and stretching exercises as demonstrated   Physical therapy appointments pended until cleared by Orthopedics

## 2020-06-17 NOTE — OP THERAPY DAILY TREATMENT
"  Outpatient Physical Therapy  DAILY TREATMENT     [facility] [address].  [address]  [place] [address]  Phone:  [phone]  Fax:  [phone]    Date: 06/17/2020    Patient: Tera Martin  YOB: 1965  MRN: [id number]     Time Calculation    Start time: 0830  Stop time: 0932 Time Calculation (min): 62 minutes         Chief Complaint: Shoulder Problem    Visit #: 3    SUBJECTIVE:  \"It feels a little better\"    OBJECTIVE:  Current objective measures:  AROM L shoulder 0-175 deg flexion and abduction with pain 2/10          Therapeutic Exercises (CPT 48369):     1. Flexion pulleys, 3 minutes    2. ON 1/2 fr, scapular depression/adduction x10, star gazer stretch/AROM x 10, alternating GH flexion x 10 ea, anterior shoulder pain 2/10 at 45-50 deg only, resolves immediately upon ceasing activity, goal post stretch/AROM x 10    6. Isometric L scapular adduction, 5 sec x 10 then repeated with shoulder at 90 deg flexion, resistance from therapist    7. Isometric L scapular depression, 5 sec x 10 then repeated with shoulder at 90 deg flexion, resistance from therapist    8. AAROM L shoulder scaption , x10, anterior shoulder pain 2/10 at 45-50 deg only, resolves immediately upon ceasing activity    9. New HEP performed as below, white band for unilateral    10. UBE reverse only L3, 3 min, no sx      Therapeutic Exercise Summary: Access Code: [email or web address]   URL: [email or web address]   Date: 06/17/2020   Prepared by: Ara Gamble     Exercises  Seated Scapular Retraction - 10 reps - 10x daily - 7x weekly  Standing Shoulder Row with Anchored Resistance - 5 reps - 3 sets - 5 seocnds hold - 3-4x daily - 7x weekly  Shoulder Internal Rotation with Resistance - 10 reps - 3 sets - 1x daily - 7x weekly  Shoulder External Rotation with Anchored Resistance - 10 reps - 3 sets - 1x daily - 7x weekly  Standing Shoulder Flexion Wall Walk - 10 reps - 3 sets - 1x daily - 7x " weekly    Therapeutic Treatments and Modalities:     1. E Stim Unattended (CPT 51303), Russian 5'/5' L supraspinatus, pectoralis w/mhp 15' supine    Time-based treatments/modalities:    Physical Therapy Timed Treatment Charges  Therapeutic exercise minutes (CPT 85991): 45 minutes      Pain rating (1-10) before treatment:  5  Pain rating (1-10) after treatment:  0    ASSESSMENT:   Response to treatment: Continued progress in AROM. Good response to scapular stabilization, wall walks.     PLAN/RECOMMENDATIONS:   Plan for treatment: therapy treatment to continue next visit.  Planned interventions for next visit: continue with current treatment.  Progress AROM/loading.

## 2020-06-17 NOTE — LETTER
[address],   [address] [place] [address]  Phone:  [phone] - Fax:  [phone]   [facility] Progress Report and Disability Certification  Date of Service: 6/17/2020   No Show:  No  Date / Time of Next Visit:   Discharged / Care Transfer to Orthopedics   Claim Information   Patient Name: Tera Martin  Claim Number:     Employer:   [employer] Date of Injury:      Insurer / TPA: [employer]  ID / SSN:     Occupation:  LABOR Diagnosis: The encounter diagnosis was Sprain of left shoulder, unspecified shoulder sprain type, subsequent encounter.    Medical Information   Related to Industrial Injury? Yes    Subjective Complaints:  DOI 5/4/20: Tera Villa is a 54 y.o. male who presents with left nondominant shoulder pain.  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  He is unsure but he also may have been injured by striking his elbow on the ground.        Patient speaks Latvian,  used at this visit.  Patient states mild improvement, moderate pain.  Pain is described as intermittent, bone-on-bone, throbbing, aching, and tingling in his fingers.  Patient denies numbness. Patient is rarely taking Tylenol because he feels it's not that bad.  Patient is icing 2-3 times a day with moderate relief.  Patient is no longer using the arm sling.  Patient has been attending Physical Therapy with mild improvements. MRI results reviewed with patient. Patient tolerating light duty.  Plan of care discussed with patient.      Objective Findings: Left shoulder: No obvious deformities noted.  No edema, erythema, bruising, or warmth present.  Limited ROM, secondary to pain.  Patient observed with improved movement, until about shoulder height.  Apley scratch test negative.  Apprehension test, pos.  Bicep tendon appears to be intact, no obvious abnormalities observed.  Distal  strength and neurovascular sensation intact, with limited exam at this visit.  Positive TTP to AC joint     Left shoulder x-ray 5/4/2020: IMPRESSION:     1.  Normal shoulder series.  MRI Left shoulder 6/10/20:   1.  Superior labral tear extending anterior to posterior and involving the biceps labral anchor.  Intra-articular portion of the long head of the biceps tendon is completely torn.  Attenuated extra-articular biceps tendon fibers within the bicipital groove with only mild retraction.  2.  Full-thickness supraspinatus and infraspinatus tendinous tears with approximately 2.9 cm of retraction.  No atrophy of the muscles bellies.  3.  Posterior descending of the humeral head and moderate sized joint effusion.   Pre-Existing Condition(s):     Assessment:   Condition Improved    Status: Discharged / Care Transfer  Permanent Disability:No    Plan: Transfer Care    Diagnostics:      Comments:  Follow-up in 3 weeks, if not seen by Orthopedics  Restricted duty, per Orthopedics  Orthopedics referral placed, transfer care   Continue with OTC Tylenol as needed   Continue with icing 2-3 times per day or as needed    Gentle range of motion and st  retching exercises as demonstrated   Physical therapy appointments pended until cleared by Orthopedics    Disability Information   Status: Released to Restricted Duty    From:  6/17/2020  Through:   Restrictions are: Temporary   Physical Restrictions   Sitting:    Standing:    Stooping:    Bending:      Squatting:    Walking:    Climbing:    Pushing:  < or = to 1 hr/day  Comments:Left shoulder only   Pulling:  < or = to 1 hr/day  Comments:Left shoulder only Other:    Reaching Above Shoulder (L): < or = to 1 hr/day Reaching Above Shoulder (R):       Reaching Below Shoulder (L):    Reaching Below Shoulder (R):      Not to exceed Weight Limits   Carrying(hrs):   Weight Limit(lb): < or = to 10 pounds  Comments:Left shoulder only Lifting(hrs):   Weight  Limit(lb): < or = to 10  pounds  Comments:Left shoulder only   Comments:      Repetitive Actions   Hands: i.e. Fine Manipulations from Grasping:     Feet: i.e. Operating Foot Controls:     Driving / Operate Machinery:     Physician Name: MARCUS Mari Physician Signature:   e-Signature: Dr. Jamey Harley, Medical Director   Clinic Name / Location: [address],   [address]  [place] [address] Clinic Phone Number: Dept: [phone]   Appointment Time: 3:00 Pm Visit Start Time: 2:56 PM   Check-In Time:  2:54 Pm Visit Discharge Time: 3:35 PM    Original-Treating Physician or Chiropractor    Page 2-Insurer/TPA    Page 3-Employer    Page 4-Employee

## 2020-06-22 ENCOUNTER — APPOINTMENT (OUTPATIENT)
Dept: PHYSICAL THERAPY | Facility: REHABILITATION | Age: 55
End: 2020-06-22
Attending: NURSE PRACTITIONER
Payer: COMMERCIAL

## 2020-06-23 ENCOUNTER — TELEPHONE (OUTPATIENT)
Dept: PHYSICAL THERAPY | Facility: REHABILITATION | Age: 55
End: 2020-06-23

## 2020-06-24 ENCOUNTER — APPOINTMENT (OUTPATIENT)
Dept: PHYSICAL THERAPY | Facility: REHABILITATION | Age: 55
End: 2020-06-24
Attending: NURSE PRACTITIONER
Payer: COMMERCIAL

## 2020-06-29 ENCOUNTER — APPOINTMENT (OUTPATIENT)
Dept: PHYSICAL THERAPY | Facility: REHABILITATION | Age: 55
End: 2020-06-29
Attending: NURSE PRACTITIONER
Payer: COMMERCIAL

## 2020-07-01 ENCOUNTER — APPOINTMENT (OUTPATIENT)
Dept: PHYSICAL THERAPY | Facility: REHABILITATION | Age: 55
End: 2020-07-01
Attending: NURSE PRACTITIONER
Payer: OTHER MISCELLANEOUS

## 2020-07-06 ENCOUNTER — APPOINTMENT (OUTPATIENT)
Dept: PHYSICAL THERAPY | Facility: REHABILITATION | Age: 55
End: 2020-07-06
Attending: NURSE PRACTITIONER
Payer: OTHER MISCELLANEOUS

## 2021-03-11 ENCOUNTER — TELEPHONE (OUTPATIENT)
Dept: PHYSICAL THERAPY | Facility: REHABILITATION | Age: 56
End: 2021-03-11

## 2021-03-11 NOTE — OP THERAPY DISCHARGE SUMMARY
Outpatient Physical Therapy  DISCHARGE SUMMARY NOTE      [facility] [address].  [address]  [place] [address]  Phone:  [phone]  Fax:  [phone]    Date of Visit: 03/11/2021    Patient: Tera Martin  YOB: 1965  MRN: [id number]     Referring Provider: No referring provider defined for this encounter.   Referring Diagnosis No admission diagnoses are documented for this encounter.         Functional Assessment Used    N/a     Your patient is being discharged from Physical Therapy with the following comments:   · Patient has failed to schedule or reschedule follow-up visits    Comments:  Last seen in PT 6/17/2020. Called on 6/23/2020 per instructions from [facility] to put PT on hold pending orthopedics consult. Patient did not call to reschedule PT.     Limitations Remaining:  Unable to assess    Recommendations:  D/c from PT    Ara Gamble PT, DPT/    Date: 3/11/2021

## 2021-09-09 ENCOUNTER — NON-PROVIDER VISIT (OUTPATIENT)
Dept: OCCUPATIONAL MEDICINE | Facility: CLINIC | Age: 56
End: 2021-09-09

## 2021-09-09 DIAGNOSIS — Z02.1 PRE-EMPLOYMENT HEALTH SCREENING EXAMINATION: ICD-10-CM

## 2021-09-09 DIAGNOSIS — Z02.1 PRE-EMPLOYMENT DRUG SCREENING: ICD-10-CM

## 2021-09-09 LAB
AMP AMPHETAMINE: NORMAL
COC COCAINE: NORMAL
INT CON NEG: NORMAL
INT CON POS: NORMAL
MET METHAMPHETAMINES: NORMAL
OPI OPIATES: NORMAL
PCP PHENCYCLIDINE: NORMAL
POC DRUG COMMENT 753798-OCCUPATIONAL HEALTH: NEGATIVE
THC: NORMAL

## 2021-09-09 PROCEDURE — 80305 DRUG TEST PRSMV DIR OPT OBS: CPT | Performed by: PREVENTIVE MEDICINE

## 2022-11-11 ENCOUNTER — OCCUPATIONAL MEDICINE (OUTPATIENT)
Dept: URGENT CARE | Facility: CLINIC | Age: 57
End: 2022-11-11
Payer: COMMERCIAL

## 2022-11-11 VITALS
HEIGHT: 67 IN | SYSTOLIC BLOOD PRESSURE: 132 MMHG | WEIGHT: 198.9 LBS | RESPIRATION RATE: 14 BRPM | BODY MASS INDEX: 31.22 KG/M2 | TEMPERATURE: 98.1 F | OXYGEN SATURATION: 96 % | DIASTOLIC BLOOD PRESSURE: 80 MMHG | HEART RATE: 76 BPM

## 2022-11-11 DIAGNOSIS — Z02.1 PRE-EMPLOYMENT DRUG SCREENING: ICD-10-CM

## 2022-11-11 DIAGNOSIS — S46.209A INJURY OF TENDON OF BICEPS: ICD-10-CM

## 2022-11-11 LAB
AMP AMPHETAMINE: NORMAL
BAR BARBITURATES: NORMAL
BZO BENZODIAZEPINES: NORMAL
COC COCAINE: NORMAL
INT CON NEG: NORMAL
INT CON POS: NORMAL
MDMA ECSTASY: NORMAL
MET METHAMPHETAMINES: NORMAL
MTD METHADONE: NORMAL
OPI OPIATES: NORMAL
OXY OXYCODONE: NORMAL
PCP PHENCYCLIDINE: NORMAL
POC URINE DRUG SCREEN OCDRS: NEGATIVE
THC: NORMAL

## 2022-11-11 PROCEDURE — 80305 DRUG TEST PRSMV DIR OPT OBS: CPT | Performed by: FAMILY MEDICINE

## 2022-11-11 PROCEDURE — 99214 OFFICE O/P EST MOD 30 MIN: CPT | Performed by: FAMILY MEDICINE

## 2022-11-11 RX ORDER — IBUPROFEN 200 MG
600 TABLET ORAL ONCE
Status: COMPLETED | OUTPATIENT
Start: 2022-11-11 | End: 2022-11-11

## 2022-11-11 RX ADMIN — Medication 600 MG: at 21:10

## 2022-11-11 ASSESSMENT — ENCOUNTER SYMPTOMS
NECK PAIN: 0
SENSORY CHANGE: 0
BACK PAIN: 0
FOCAL WEAKNESS: 0

## 2022-11-11 NOTE — LETTER
[facility] [address] [place] [address]  Phone:  [phone] - Fax:  [phone]   [facility] Progress Report and Disability Certification  Date of Service: 11/11/2022   No Show:  No  Date / Time of Next Visit: 12/2/2022   Claim Information   Patient Name: Tera Esteves  Claim Number:     Employer: [employer] *** Date of Injury: 11/11/2022     Insurer / TPA: [employer] *** ID / SSN:     Occupation:  *** Diagnosis: Diagnoses of Pre-employment drug screening and Injury of tendon of biceps were pertinent to this visit.    Medical Information   Related to Industrial Injury? Yes ***   Subjective Complaints:  DOI 11/11/2022  Right arm injury.  DANIE: Fell to pop right distal bicep while lifting heavy fire hydrant.  Pain severity 5/10 with movement. Right hand dominant. No prior injury. No second job or outside activity contributing.    Objective Findings: Right upper extremity: tender distal biceps, subtle change in contour of biceps muscle. No ecchymosis. ROM intact. Distal neuro/vascular intact.      Pre-Existing Condition(s):     Assessment:   Initial Visit    Status: Additional Care Required  Permanent Disability:No    Plan:   Comments:MRI, transfer care to ortho and occupational medicine    Diagnostics: MRI  Comments:pending    Comments:       Disability Information   Status: Released to Restricted Duty    From:  11/11/2022  Through: 12/2/2022 Restrictions are: Temporary   Physical Restrictions   Sitting:    Standing:    Stooping:    Bending:      Squatting:    Walking:    Climbing:    Pushing:      Pulling:    Other:    Reaching Above Shoulder (L):   Reaching Above Shoulder (R):       Reaching Below Shoulder (L):    Reaching Below Shoulder (R):      Not to exceed Weight Limits   Carrying(hrs):   Weight Limit(lb):   Lifting(hrs):   Weight  Limit(lb):     Comments: No pushing, pulling, lifting or carrying with right  upper extremity. Please allow sling.     Repetitive Actions   Hands: i.e. Fine Manipulations from Grasping:     Feet: i.e. Operating Foot Controls:     Driving / Operate Machinery:     Health Care Provider’s Original or Electronic Signature  Adriano Osorio M.D. Health Care Provider’s Original or Electronic Signature    Nando Caldwell DO MPH     Clinic Name / Location: [place] [address]  [place] [address] Clinic Phone Number: Dept: [phone]   Appointment Time: 4:30 Pm Visit Start Time: 5:54 PM   Check-In Time:  4:24 Pm Visit Discharge Time:  ***   Original-Treating Physician or Chiropractor    Page 2-Insurer/TPA    Page 3-Employer    Page 4-Employee

## 2022-11-11 NOTE — LETTER
[facility] [address] [place] [address]  Phone:  [phone] - Fax:  [phone]   [facility] Progress Report and Disability Certification  Date of Service: 11/11/2022   No Show:  No  Date / Time of Next Visit: 12/2/2022 9:00 AM   Claim Information   Patient Name: Tera Esteves  Claim Number:     Employer: [employer]  Date of Injury: 11/11/2022     Insurer / TPA: [employer]  ID / SSN:     Occupation:   Diagnosis: Diagnoses of Pre-employment drug screening and Injury of tendon of biceps were pertinent to this visit.    Medical Information   Related to Industrial Injury? Yes    Subjective Complaints:  DOI 11/11/2022  Right arm injury.  DANIE: Fell to pop right distal bicep while lifting heavy fire hydrant.  Pain severity 5/10 with movement. Right hand dominant. No prior injury. No second job or outside activity contributing.    Objective Findings: Right upper extremity: tender distal biceps, subtle change in contour of biceps muscle. No ecchymosis. ROM intact. Distal neuro/vascular intact.      Pre-Existing Condition(s):     Assessment:   Initial Visit    Status: Additional Care Required  Permanent Disability:No    Plan:   Comments:MRI, transfer care to ortho and occupational medicine    Diagnostics: MRI  Comments:pending    Comments:       Disability Information   Status: Released to Restricted Duty    From:  11/11/2022  Through: 12/2/2022 Restrictions are: Temporary   Physical Restrictions   Sitting:    Standing:    Stooping:    Bending:      Squatting:    Walking:    Climbing:    Pushing:      Pulling:    Other:    Reaching Above Shoulder (L):   Reaching Above Shoulder (R):       Reaching Below Shoulder (L):    Reaching Below Shoulder (R):      Not to exceed Weight Limits   Carrying(hrs):   Weight Limit(lb):   Lifting(hrs):   Weight  Limit(lb):     Comments: No pushing, pulling, lifting or carrying with right  upper extremity. Please allow sling.     Repetitive Actions   Hands: i.e. Fine Manipulations from Grasping:     Feet: i.e. Operating Foot Controls:     Driving / Operate Machinery:     Health Care Provider’s Original or Electronic Signature  Adriano Osorio M.D. Health Care Provider’s Original or Electronic Signature    Nando Caldwell DO MPH     Clinic Name / Location: [place] [address]  [place] [address] Clinic Phone Number: Dept: [phone]   Appointment Time: 4:30 Pm Visit Start Time: 5:54 PM   Check-In Time:  4:24 Pm Visit Discharge Time:     Original-Treating Physician or Chiropractor    Page 2-Insurer/TPA    Page 3-Employer    Page 4-Employee

## 2022-11-11 NOTE — LETTER
"EMPLOYEE’S CLAIM FOR COMPENSATION/ REPORT OF INITIAL TREATMENT  FORM C-4    EMPLOYEE’S CLAIM - PROVIDE ALL INFORMATION REQUESTED   First Name  Tera Last Name  Danielito Birthdate                    1965                Sex  male Claim Number (Insurer’s Use Only)    Home Address  [address] Age  56 y.o. Height  1.702 m (5' 7\") Weight  90.2 kg (198 lb 14.4 oz) [facility]     [facility] Zip  [address] Telephone  [phone] (home) [phone] (work)   Mailing Address  [address] [facility] Zip  [address] Primary Language Spoken  Libyan    Insurer   Third-Party   [employer]   Employee's Occupation (Job Title) When Injury or Occupational Disease Occurred      Employer's Name/Company Name  [employer]  Telephone  [phone]    Office Mail Address (Number and Street)   [address]  [facility]  Zip  [address]    Date of Injury  11/11/2022               Hours Injury  2:30 PM Date Employer Notified  11/11/2022 Last Day of Work after Injury     or Occupational Disease  11/11/2022 Supervisor to Whom Injury     Reported  Luis Antonio Nascimento   Address or Location of Accident (if applicable)  Work [1]   What were you doing at the time of accident? (if applicable)  Lifting    How did this injury or occupational disease occur? (Be specific an answer in detail. Use additional sheet if necessary)  I lifting a fire hydren my right arm popped   If you believe that you have an occupational disease, when did you first have knowledge of the disability and it relationship to your employment?  n/a Witnesses to the Accident  Jorge Reynolds      Nature of Injury or Occupational Disease  Strain  Part(s) of Body Injured or Affected  Upper Arm (R), ,     I certify that the above is true and correct to the best of my knowledge and that I have provided this information in order to " obtain the benefits of [place]  I hereby authorize any physician, chiropractor, surgeon, practitioner, or other person, any hospital, including [facility] or [facility] hospital, any medical service organization, any insurance company, or other institution or organization to release to each other, any medical or other information, including benefits paid or payable, pertinent to this injury or disease, except information relative to diagnosis, treatment and/or counseling for AIDS, psychological conditions, alcohol or controlled substances, for which I must give specific authorization.  A Photostat of this authorization shall be as valid as the original.     Date 11/11/22   [facility] Employee’s Original or  *Electronic Signature   THIS REPORT MUST BE COMPLETED AND MAILED WITHIN 3 WORKING DAYS OF TREATMENT   Place  [facility]  Name of Facility  [facility]   Date  11/11/2022 Diagnosis and Description of Injury or Occupational Disease  (Z02.1) Pre-employment drug screening  (S46.209A) Injury of tendon of biceps Is there evidence the injured employee was under the influence of alcohol and/or another controlled substance at the time of accident?  ? No ? Yes (if yes, please explain)    Hour  5:54 PM   Diagnoses of Pre-employment drug screening and Injury of tendon of biceps were pertinent to this visit. No   Treatment  Rest, ice, nsaid as needed.   Have you advised the patient to remain off work five days or     more?    X-Ray Findings    Comments:MRI pending   ? Yes Indicate dates:   From   To      From information given by the employee, together with medical evidence, can        you directly connect this injury or occupational disease as job incurred?  Yes ? No If no, is the injured employee capable of:  ? full duty  No ? modified duty  Yes   Is additional medical care by a physician  "indicated?  Yes If Modified Duty, Specify any Limitations / Restrictions  No lifting, carrying, pushing, or pulling with right upper extremity. Please allow sling.    Do you know of any previous injury or disease contributing to this condition or occupational disease?  ? Yes ? No (Explain if yes)                          No   Date  11/11/2022 Print Health Care Provider's   Adriano Osorio M.D. I certify the employer’s copy of  this form was mailed on:   Address  [address] [place] [address] Insurer’s Use Only     [facility] Zip  [address]    Provider’s Tax ID Number  [id number] Telephone  Dept: [phone]             Health Care Provider’s Original or Electronic Signature  e-ADRIANO Moya M.D. Degree (MD,DO, DC,PARaheemC,APRN)   MD      * Complete and attach Release of Information (Form C-4A) when injured employee signs C-4 Form electronically  ORIGINAL - TREATING HEALTHCARE PROVIDER PAGE 2 - INSURER/TPA PAGE 3 - EMPLOYER PAGE 4 - EMPLOYEE             Form C-4 (rev.08/21)           BRIEF DESCRIPTION OF RIGHTS AND BENEFITS  (Pursuant to [place] 616[place])    Notice of Injury or Occupational Disease (Incident Report Form C-1): If an injury or occupational disease (OD) arises out of and in the course of employment, you must provide written notice to your employer as soon as practicable, but no later than 7 days after the accident or OD. Your employer shall maintain a sufficient supply of the required forms.    Claim for Compensation (Form C-4): If medical treatment is sought, the form C-4 is available at the place of initial treatment. A completed \"Claim for Compensation\" (Form C-4) must be filed within 90 days after an accident or OD. The treating physician or chiropractor must, within 3 working days after treatment, complete and mail to the employer, the employer's insurer and third-party , the Claim for Compensation.    Medical Treatment: If you require medical treatment for your on-the-job injury or OD, " you may be required to select a physician or chiropractor from a list provided by your workers’ compensation insurer, if it has contracted with an Organization for Managed Care (MCO) or Preferred Provider Organization (PPO) or providers of health care. If your employer has not entered into a contract with an MCO or PPO, you may select a physician or chiropractor from the Panel of Physicians and Chiropractors. Any medical costs related to your industrial injury or OD will be paid by your insurer.    Temporary Total Disability (TTD): If your doctor has certified that you are unable to work for a period of at least 5 consecutive days, or 5 cumulative days in a 20-day period, or places restrictions on you that your employer does not accommodate, you may be entitled to TTD compensation.    Temporary Partial Disability (TPD): If the wage you receive upon reemployment is less than the compensation for TTD to which you are entitled, the insurer may be required to pay you TPD compensation to make up the difference. TPD can only be paid for a maximum of 24 months.    Permanent Partial Disability (PPD): When your medical condition is stable and there is an indication of a PPD as a result of your injury or OD, within 30 days, your insurer must arrange for an evaluation by a rating physician or chiropractor to determine the degree of your PPD. The amount of your PPD award depends on the date of injury, the results of the PPD evaluation, your age and wage.    Permanent Total Disability (PTD): If you are medically certified by a treating physician or chiropractor as permanently and totally disabled and have been granted a PTD status by your insurer, you are entitled to receive monthly benefits not to exceed 66 2/3% of your average monthly wage. The amount of your PTD payments is subject to reduction if you previously received a lump-sum PPD award.    Vocational Rehabilitation Services: You may be eligible for vocational  rehabilitation services if you are unable to return to the job due to a permanent physical impairment or permanent restrictions as a result of your injury or occupational disease.    Transportation and Per Vani Reimbursement: You may be eligible for travel expenses and per vani associated with medical treatment.    Reopening: You may be able to reopen your claim if your condition worsens after claim closure.     Appeal Process: If you disagree with a written determination issued by the insurer or the insurer does not respond to your request, you may appeal to the Department of Administration, , by following the instructions contained in your determination letter. You must appeal the determination within 70 days from the date of the determination letter at [address], [address], [place], [place] [address], or [address], [place], [place] [address]. If you disagree with the  decision, you may appeal to the Department of Administration, . You must file your appeal within 30 days from the date of the  decision letter at [address], [address], [place], [place] [address], or [address], [address], [place], [place] [address]. If you disagree with a decision of an , you may file a petition for judicial review with the District Court. You must do so within 30 days of the Appeal Officer’s decision. You may be represented by an  at your own expense or you may contact the [facility] for possible representation.    [place]  for Injured Workers (NAIW): If you disagree with a  decision, you may request that NAIW represent you without charge at an  Hearing. For information regarding denial of benefits, you may contact the [facility] at: [address], [address], [place] [address], [phone], or [address] [facility] [place] [address], [place] [address], [phone]  [phone]    To File a Complaint with the Division: If you wish to file a complaint with the  of the Division of Industrial Relations (DIR),  please contact the Workers’ Compensation Section, [address], [address], [place], [place] [address], telephone [phone]) [phone], or [address], [address], [place], [place] [address], telephone [phone].    For assistance with Workers’ Compensation Issues: You may contact the [facility] Office for Consumer Health Assistance, [address], [address], [place], [place] [address], Toll Free 1-366.707.4455, Web site: http://[facility] hospital.nv.gov/Programs/[facility] E-mail: weston@[facility].nv.gov              __________________________________________________________________                                    _________________            Employee Name / Signature                                                                                                                            Date                                                                                                                                                                                                                              D-2 (rev. 10/20)

## 2022-12-02 ENCOUNTER — OCCUPATIONAL MEDICINE (OUTPATIENT)
Dept: URGENT CARE | Facility: CLINIC | Age: 57
End: 2022-12-02
Payer: COMMERCIAL

## 2022-12-02 VITALS
HEIGHT: 67 IN | BODY MASS INDEX: 31.08 KG/M2 | RESPIRATION RATE: 16 BRPM | HEART RATE: 87 BPM | DIASTOLIC BLOOD PRESSURE: 84 MMHG | SYSTOLIC BLOOD PRESSURE: 120 MMHG | WEIGHT: 198 LBS | OXYGEN SATURATION: 96 % | TEMPERATURE: 97.6 F

## 2022-12-02 DIAGNOSIS — S46.211A TEAR OF RIGHT BICEPS MUSCLE, INITIAL ENCOUNTER: ICD-10-CM
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
## 2022-12-02 PROCEDURE — 99213 OFFICE O/P EST LOW 20 MIN: CPT | Performed by: STUDENT IN AN ORGANIZED HEALTH CARE EDUCATION/TRAINING PROGRAM

## 2022-12-02 NOTE — LETTER
[facility] [place] [facility] [place] [address] [facility] [address] [address] [address]  Phone:  [phone] - Fax:  [phone]   [facility] Progress Report and Disability Certification  Date of Service: 12/2/2022   No Show:  No  Date / Time of Next Visit: 12/9/2022 w/ OCC MED   Claim Information   Patient Name: Tera Esteves  Claim Number:     Employer: [employer]  Date of Injury: 11/11/2022     Insurer / TPA: [employer]  ID / SSN:     Occupation:   Diagnosis: Diagnoses of Tear of right biceps muscle, subsequent encounter and Work related injury were pertinent to this visit.    Medical Information   Related to Industrial Injury? Yes    Subjective Complaints:  DOI 11/11/2022  Right arm injury.  DANIE: Fell to pop right distal bicep while lifting heavy fire hydrant.  Pain severity 5/10 with movement. Right hand dominant. No prior injury. No second job or outside activity contributing.      Today's date: 12/3/2022.  Visit #2.  Patient reports continued pain along his right biceps.  Symptoms aggravated with flexion and lifting.  He has been wearing a sling.  MRI was ordered at last visit which has not yet been performed.  Referrals were submitted to follow-up with occupational medicine in and orthopedics.   Objective Findings: Gen: no acute distress, normal voice  Skin: dry, intact, moist mucosal membranes  Head: Atraumatic, normocephalic  Psych: normal affect, normal judgement, alert, awake  Musculoskeletal: No erythema or ecchymosis.  Deformity of distal right biceps but was able to palpate the tendon distally.  Tenderness with resisted flexion.  Full range of motion.       Pre-Existing Condition(s):     Assessment:   Condition Same    Status: Additional Care Required  Permanent Disability:No    Plan:      Diagnostics:      Comments:       Disability Information   Status: Released to Restricted Duty    From:  12/2/2022  Through: 12/9/2022 Restrictions are:  Temporary   Physical Restrictions   Sitting:    Standing:    Stooping:    Bending:      Squatting:    Walking:    Climbing:    Pushing:      Pulling:    Other:    Reaching Above Shoulder (L):   Reaching Above Shoulder (R):       Reaching Below Shoulder (L):    Reaching Below Shoulder (R):      Not to exceed Weight Limits   Carrying(hrs):   Weight Limit(lb): < or = to 25 pounds  Comments:Right arm only Lifting(hrs):   Weight  Limit(lb): < or = to 25 pounds  Comments:Right arm only   Comments: Examination consistent tear of the distal biceps tendon.  Possible tendinous involvement however I was able to palpate distal extent of the biceps tendon.  - Restrictions per D 39 (25 pound weight limit with right upper extremity only)  -Discontinue sling  - Tylenol/ibuprofen as needed for symptomatic relief  - Follow-up with occupational medicine at [address] [place] [address] in 1 week  -Referrals for orthopedics and MRI were ordered at previous visit and pending.    Repetitive Actions   Hands: i.e. Fine Manipulations from Grasping:     Feet: i.e. Operating Foot Controls:     Driving / Operate Machinery:     Health Care Provider’s Original or Electronic Signature  Qamar Philip D.O. Health Care Provider’s Original or Electronic Signature    Nando Caldwell DO MPH     Clinic Name / Location: [address] [facility]  [place] [address] Clinic Phone Number: Dept: [phone]   Appointment Time: 9:00 Am Visit Start Time: 10:05 AM   Check-In Time:  8:53 Am Visit Discharge Time:  10:40 AM    Original-Treating Physician or Chiropractor    Page 2-Insurer/TPA    Page 3-Employer    Page 4-Employee

## 2022-12-02 NOTE — PROGRESS NOTES
"Subjective:     Tera Esteves is a 56 y.o. male who presents for Follow-Up (Right arm )      DOI 11/11/2022  Right arm injury.  DANIE: Fell to pop right distal bicep while lifting heavy fire hydrant.  Pain severity 5/10 with movement. Right hand dominant. No prior injury. No second job or outside activity contributing.      Today's date: 12/3/2022.  Visit #2.  Patient reports continued pain along his right biceps.  Symptoms aggravated with flexion and lifting.  He has been wearing a sling.  MRI was ordered at last visit which has not yet been performed.  Referrals were submitted to follow-up with occupational medicine in and orthopedics.    PMH:   No pertinent past medical history to this problem  MEDS:  Medications were reviewed in EMR  ALLERGIES:  Allergies were reviewed in EMR  FH:   No pertinent family history to this problem       Objective:     /84   Pulse 87   Temp 36.4 °C (97.6 °F) (Temporal)   Resp 16   Ht 1.702 m (5' 7\")   Wt 89.8 kg (198 lb)   SpO2 96%   BMI 31.01 kg/m²     Gen: no acute distress, normal voice  Skin: dry, intact, moist mucosal membranes  Head: Atraumatic, normocephalic  Psych: normal affect, normal judgement, alert, awake  Musculoskeletal: No erythema or ecchymosis.  Deformity of distal right biceps but was able to palpate the tendon distally.  Tenderness with resisted flexion.  Full range of motion.        Assessment/Plan:       1. Tear of right biceps muscle, subsequent encounter    2. Work related injury    Released to Restricted Duty FROM 12/2/2022 TO 12/9/2022  Examination consistent tear of the distal biceps tendon.  Possible tendinous involvement however I was able to palpate distal extent of the biceps tendon.  - Restrictions per D 39 (25 pound weight limit with right upper extremity only)  -Discontinue sling  - Tylenol/ibuprofen as needed for symptomatic relief  - Follow-up with occupational medicine at [address] [place] [address] in 1 week  -Referrals for orthopedics and MRI " were ordered at previous visit and pending.       Differential diagnosis, natural history, supportive care, and indications for immediate follow-up discussed.

## 2022-12-15 PROBLEM — S46.211A RUPTURE OF DISTAL BICEPS TENDON, RIGHT, INITIAL ENCOUNTER: Status: ACTIVE | Noted: 2022-12-15

## 2022-12-16 ENCOUNTER — HOSPITAL ENCOUNTER (OUTPATIENT)
Facility: MEDICAL CENTER | Age: 57
End: 2022-12-16
Attending: ANESTHESIOLOGY
Payer: COMMERCIAL

## 2022-12-16 PROCEDURE — U0005 INFEC AGEN DETEC AMPLI PROBE: HCPCS

## 2022-12-16 PROCEDURE — U0003 INFECTIOUS AGENT DETECTION BY NUCLEIC ACID (DNA OR RNA); SEVERE ACUTE RESPIRATORY SYNDROME CORONAVIRUS 2 (SARS-COV-2) (CORONAVIRUS DISEASE [COVID-19]), AMPLIFIED PROBE TECHNIQUE, MAKING USE OF HIGH THROUGHPUT TECHNOLOGIES AS DESCRIBED BY CMS-2020-01-R: HCPCS

## 2022-12-17 LAB
SARS-COV-2 RNA RESP QL NAA+PROBE: NOTDETECTED
SPECIMEN SOURCE: NORMAL

## 2024-12-12 ENCOUNTER — APPOINTMENT (OUTPATIENT)
Dept: MEDICAL GROUP | Facility: CLINIC | Age: 59
End: 2024-12-12
Payer: COMMERCIAL